# Patient Record
Sex: FEMALE | Race: ASIAN | Employment: FULL TIME | URBAN - METROPOLITAN AREA
[De-identification: names, ages, dates, MRNs, and addresses within clinical notes are randomized per-mention and may not be internally consistent; named-entity substitution may affect disease eponyms.]

---

## 2018-07-02 ENCOUNTER — OFFICE VISIT (OUTPATIENT)
Dept: SURGERY | Age: 43
End: 2018-07-02

## 2018-07-02 VITALS
HEIGHT: 68 IN | SYSTOLIC BLOOD PRESSURE: 108 MMHG | WEIGHT: 198 LBS | TEMPERATURE: 98.6 F | HEART RATE: 93 BPM | OXYGEN SATURATION: 97 % | DIASTOLIC BLOOD PRESSURE: 82 MMHG | BODY MASS INDEX: 30.01 KG/M2 | RESPIRATION RATE: 17 BRPM

## 2018-07-02 DIAGNOSIS — K64.2 GRADE III HEMORRHOIDS: Primary | ICD-10-CM

## 2018-07-02 NOTE — PROGRESS NOTES
HPI: Wilmar Reeder is a 41 y.o. female presenting with chief complain of hemorrhoids. They have gotten larger more recently. There is pain and throbbing discomfort. She has tried sitz baths and Preparation H. She has occasional lower abdominal pain when she has constipation. She has 2 bowel movements per day. She is not on a regimen. The constipation is better when she takes fluids. She denies blood per rectum or fecal incontinence. She is a  with no history of tear. Past Medical History:   Diagnosis Date    Anal fissure     Constipation     pain. BM every 2-3 weeks. patient cannot evacuate unless she uses a rocking motion.  Hemorrhoids     external    Infrequent bowel movements        Past Surgical History:   Procedure Laterality Date    HX BUNIONECTOMY      HX COLONOSCOPY  10/09/2009    HX HYSTERECTOMY      HX TONSILLECTOMY         Family History   Problem Relation Age of Onset    Cancer Paternal Aunt      stomach    Cancer Other      breast.  father's side of family       Social History     Social History    Marital status: SINGLE     Spouse name: N/A    Number of children: N/A    Years of education: N/A     Social History Main Topics    Smoking status: Never Smoker    Smokeless tobacco: Never Used    Alcohol use No    Drug use: No    Sexual activity: Yes     Other Topics Concern    None     Social History Narrative       Review of Systems - Review of Systems   Constitutional: Positive for weight loss. Negative for chills, diaphoresis, fever and malaise/fatigue. Trying to lose weight   HENT: Negative. Eyes: Negative. Respiratory: Positive for cough. Negative for hemoptysis, sputum production, shortness of breath and wheezing. Cardiovascular: Negative. Gastrointestinal: Positive for abdominal pain. Negative for blood in stool, constipation, diarrhea, heartburn, melena, nausea and vomiting. Genitourinary: Negative. Musculoskeletal: Negative.     Skin: Negative. Neurological: Negative. Negative for weakness. Endo/Heme/Allergies: Negative. Psychiatric/Behavioral: Negative. No Known Allergies    Vitals:    07/02/18 1122   BP: 108/82   Pulse: 93   Resp: 17   Temp: 98.6 °F (37 °C)   TempSrc: Oral   SpO2: 97%   Weight: 89.8 kg (198 lb)   Height: 5' 8\" (1.727 m)   PainSc:   3   PainLoc: Rectum       Physical Exam   Constitutional: She appears well-developed and well-nourished. HENT:   Head: Normocephalic and atraumatic. Eyes: Conjunctivae and EOM are normal.   Abdominal: Soft. She exhibits no distension. There is no tenderness. Musculoskeletal: Normal range of motion. Lymphadenopathy:     She has no cervical adenopathy. Right: No inguinal adenopathy present. Left: No inguinal adenopathy present. Neurological: She exhibits normal muscle tone. Skin: Skin is warm and dry. No rash noted. Psychiatric: She has a normal mood and affect. Her speech is normal.   Rectum: Enlarged and swollen right posterior quadrant hemorrhoid with visible mucosa,  right anterior and left lateral are less enlarged  Anoscopy: Enlarged left lateral internal hemorrhoid    Assessment / Plan    Grade 3 hemorrhoids  Trial of Citrucel while she is in the process of getting   She will return for follow-up after the honeymoon if this still symptomatic to consider hemorrhoidectomy    The diagnoses and plan were discussed with the patient. All questions answered. Plan of care agreed to by all concerned.

## 2018-07-02 NOTE — LETTER
7/2/2018 12:00 PM 
 
Patient:  Wilmar Reeder YOB: 1975 Date of Visit: 7/2/2018 HUYEN Alfaro [address] [address] VIA Facsimile: [phone] Dear Alex December, I saw Sai Mcgowan in the office today regarding her hemorrhoids. They have been bothering her for several years now, but more recently has gotten very swollen. On exam she has a very large right posterior quadrant hemorrhoid. On anoscopy she has enlarged internal hemorrhoids on the left side. She is to be  later this summer, and I recommend a high-fiber diet and fiber supplementation to see if this helps. If she is still symptomatic after the wedding she will return to the office and we will discuss hemorrhoidectomy. Thank you very much for your referral of Ms. Wilmar Reeder. If you have questions, please do not hesitate to call me. I look forward to following Ms. Edgardo Ashton along with you and will keep you updated as to her progress. Sincerely, Margo Irby MD

## 2018-09-17 ENCOUNTER — OFFICE VISIT (OUTPATIENT)
Dept: SURGERY | Age: 43
End: 2018-09-17

## 2018-09-17 VITALS
SYSTOLIC BLOOD PRESSURE: 120 MMHG | DIASTOLIC BLOOD PRESSURE: 84 MMHG | HEART RATE: 72 BPM | RESPIRATION RATE: 18 BRPM | WEIGHT: 195 LBS | TEMPERATURE: 98.7 F | BODY MASS INDEX: 29.55 KG/M2 | HEIGHT: 68 IN

## 2018-09-17 DIAGNOSIS — K64.2 GRADE III HEMORRHOIDS: Primary | ICD-10-CM

## 2018-09-17 NOTE — PROGRESS NOTES
Subjective: Continued discomfort from enlarged hemorrhoids. Past medical history and ROS were reviewed and unchanged. Rectum: Prolapsed right-sided hemorrhoid, left-sided hemorrhoid smaller  Digital rectal exam: Excellent tone, no mass    Assessment / Plan    Grade 3 hemorrhoids, continued symptoms  Schedule hemorrhoidectomy    A total of 15 minutes was spent with the patient, with >50% of time spent on counseling and coordination of care. The diagnoses and plan were discussed with patient. All questions answered. Plan of care agreed to by all concerned.

## 2018-09-17 NOTE — MR AVS SNAPSHOT
[address] [place] [address] 
[phone] Patient: Lan Emanuel MRN: [id number] :1975 Visit Information Date & Time Provider Department Dept. Phone Encounter #  
 2018  1:00 PM Tamara Gonzalez MD [facility] [phone] Follow-up Instructions Return for Surgery. Follow-up and Disposition History Upcoming Health Maintenance Date Due DTaP/Tdap/Td series (1 - Tdap) 1998 PAP AKA CERVICAL CYTOLOGY 1998 Influenza Age 5 to Adult 2018 COLONOSCOPY 10/9/2019 Allergies as of 2018  Review Complete On: 2018 By: Tamara Gonzalez MD  
 No Known Allergies Current Immunizations  Never Reviewed No immunizations on file. Not reviewed this visit You Were Diagnosed With   
  
 Codes Comments Grade III hemorrhoids    -  Primary ICD-10-CM: X77.6 ICD-9-CM: 455.0 Vitals BP Pulse Temp Resp Height(growth percentile) Weight(growth percentile) 120/84 72 98.7 °F (37.1 °C) 18 5' 8\" (1.727 m) 195 lb (88.5 kg) BMI OB Status Smoking Status 29.65 kg/m2 Hysterectomy Never Smoker BMI and BSA Data Body Mass Index Body Surface Area  
 29.65 kg/m 2 2.06 m 2 Your Updated Medication List  
  
Notice  As of 2018  1:18 PM  
 You have not been prescribed any medications. We Performed the Following SCHEDULE SURGERY [NLU7365 Custom] Follow-up Instructions Return for Surgery. Introducing [facility]! [employer] introduces [employer] patient portal. Now you can access parts of your medical record, email your doctor's office, and request medication refills online. 1. In your internet browser, go to [email or web address] 2. Click on the First Time User? Click Here link in the Sign In box. You will see the New Member Sign Up page. 3. Enter your [employer] Access Code exactly as it appears below. You will not need to use this code after youve completed the sign-up process. If you do not sign up before the expiration date, you must request a new code. · [employer] Access Code: [email or web address] Expires: 9/30/2018 11:07 AM 
 
4. Enter the last four digits of your Social Security Number (xxxx) and Date of Birth (mm/dd/yyyy) as indicated and click Submit. You will be taken to the next sign-up page. 5. Create a [employer] ID. This will be your [employer] login ID and cannot be changed, so think of one that is secure and easy to remember. 6. Create a [employer] password. You can change your password at any time. 7. Enter your Password Reset Question and Answer. This can be used at a later time if you forget your password. 8. Enter your e-mail address. You will receive e-mail notification when new information is available in [address]. 9. Click Sign Up. You can now view and download portions of your medical record. 10. Click the Download Summary menu link to download a portable copy of your medical information. If you have questions, please visit the Frequently Asked Questions section of the [employer] website. Remember, [employer] is NOT to be used for urgent needs. For medical emergencies, dial 911. Now available from your iPhone and Android! Please provide this summary of care documentation to your next provider. Your primary care clinician is listed as Cortney Hamm. If you have any questions after today's visit, please call [phone].

## 2018-09-27 ENCOUNTER — ANESTHESIA EVENT (OUTPATIENT)
Dept: SURGERY | Age: 43
End: 2018-09-27
Payer: COMMERCIAL

## 2018-09-28 ENCOUNTER — HOSPITAL ENCOUNTER (OUTPATIENT)
Age: 43
Setting detail: OUTPATIENT SURGERY
Discharge: HOME | End: 2018-09-28
Attending: COLON & RECTAL SURGERY | Admitting: COLON & RECTAL SURGERY
Payer: COMMERCIAL

## 2018-09-28 ENCOUNTER — ANESTHESIA (OUTPATIENT)
Dept: SURGERY | Age: 43
End: 2018-09-28
Payer: COMMERCIAL

## 2018-09-28 VITALS
HEART RATE: 77 BPM | HEIGHT: 68 IN | WEIGHT: 196.6 LBS | BODY MASS INDEX: 29.8 KG/M2 | DIASTOLIC BLOOD PRESSURE: 70 MMHG | TEMPERATURE: 98.1 F | RESPIRATION RATE: 18 BRPM | OXYGEN SATURATION: 100 % | SYSTOLIC BLOOD PRESSURE: 110 MMHG

## 2018-09-28 DIAGNOSIS — K64.2 GRADE III HEMORRHOIDS: Primary | ICD-10-CM

## 2018-09-28 PROCEDURE — 74011250636 HC RX REV CODE- 250/636

## 2018-09-28 PROCEDURE — 76060000033 HC ANESTHESIA 1 TO 1.5 HR: Performed by: COLON & RECTAL SURGERY

## 2018-09-28 PROCEDURE — 74011250636 HC RX REV CODE- 250/636: Performed by: NURSE ANESTHETIST, CERTIFIED REGISTERED

## 2018-09-28 PROCEDURE — 76210000021 HC REC RM PH II 0.5 TO 1 HR: Performed by: COLON & RECTAL SURGERY

## 2018-09-28 PROCEDURE — 76010000149 HC OR TIME 1 TO 1.5 HR: Performed by: COLON & RECTAL SURGERY

## 2018-09-28 PROCEDURE — 77030032490 HC SLV COMPR SCD KNE COVD -B: Performed by: COLON & RECTAL SURGERY

## 2018-09-28 PROCEDURE — 74011000250 HC RX REV CODE- 250: Performed by: COLON & RECTAL SURGERY

## 2018-09-28 PROCEDURE — 77030018836 HC SOL IRR NACL ICUM -A: Performed by: COLON & RECTAL SURGERY

## 2018-09-28 PROCEDURE — 76210000006 HC OR PH I REC 0.5 TO 1 HR: Performed by: COLON & RECTAL SURGERY

## 2018-09-28 PROCEDURE — 74011250637 HC RX REV CODE- 250/637: Performed by: NURSE ANESTHETIST, CERTIFIED REGISTERED

## 2018-09-28 PROCEDURE — 77030011266 HC ELECTRD BLD INSL COVD -A: Performed by: COLON & RECTAL SURGERY

## 2018-09-28 PROCEDURE — 77030031139 HC SUT VCRL2 J&J -A: Performed by: COLON & RECTAL SURGERY

## 2018-09-28 PROCEDURE — 88304 TISSUE EXAM BY PATHOLOGIST: CPT | Performed by: COLON & RECTAL SURGERY

## 2018-09-28 PROCEDURE — 74011250636 HC RX REV CODE- 250/636: Performed by: COLON & RECTAL SURGERY

## 2018-09-28 RX ORDER — DEXAMETHASONE SODIUM PHOSPHATE 4 MG/ML
INJECTION, SOLUTION INTRA-ARTICULAR; INTRALESIONAL; INTRAMUSCULAR; INTRAVENOUS; SOFT TISSUE AS NEEDED
Status: DISCONTINUED | OUTPATIENT
Start: 2018-09-28 | End: 2018-09-28 | Stop reason: HOSPADM

## 2018-09-28 RX ORDER — HYDROMORPHONE HYDROCHLORIDE 2 MG/ML
0.5 INJECTION, SOLUTION INTRAMUSCULAR; INTRAVENOUS; SUBCUTANEOUS AS NEEDED
Status: DISCONTINUED | OUTPATIENT
Start: 2018-09-28 | End: 2018-09-28 | Stop reason: HOSPADM

## 2018-09-28 RX ORDER — PROPOFOL 10 MG/ML
INJECTION, EMULSION INTRAVENOUS AS NEEDED
Status: DISCONTINUED | OUTPATIENT
Start: 2018-09-28 | End: 2018-09-28 | Stop reason: HOSPADM

## 2018-09-28 RX ORDER — OXYCODONE AND ACETAMINOPHEN 5; 325 MG/1; MG/1
1-2 TABLET ORAL
Qty: 40 TAB | Refills: 0 | Status: SHIPPED | OUTPATIENT
Start: 2018-09-28

## 2018-09-28 RX ORDER — INSULIN LISPRO 100 [IU]/ML
INJECTION, SOLUTION INTRAVENOUS; SUBCUTANEOUS ONCE
Status: DISCONTINUED | OUTPATIENT
Start: 2018-09-28 | End: 2018-09-28 | Stop reason: HOSPADM

## 2018-09-28 RX ORDER — SODIUM CHLORIDE 0.9 % (FLUSH) 0.9 %
5-10 SYRINGE (ML) INJECTION EVERY 8 HOURS
Status: DISCONTINUED | OUTPATIENT
Start: 2018-09-28 | End: 2018-09-28 | Stop reason: HOSPADM

## 2018-09-28 RX ORDER — FENTANYL CITRATE 50 UG/ML
INJECTION, SOLUTION INTRAMUSCULAR; INTRAVENOUS AS NEEDED
Status: DISCONTINUED | OUTPATIENT
Start: 2018-09-28 | End: 2018-09-28 | Stop reason: HOSPADM

## 2018-09-28 RX ORDER — SODIUM CHLORIDE, SODIUM LACTATE, POTASSIUM CHLORIDE, CALCIUM CHLORIDE 600; 310; 30; 20 MG/100ML; MG/100ML; MG/100ML; MG/100ML
75 INJECTION, SOLUTION INTRAVENOUS CONTINUOUS
Status: DISCONTINUED | OUTPATIENT
Start: 2018-09-28 | End: 2018-09-28 | Stop reason: HOSPADM

## 2018-09-28 RX ORDER — FAMOTIDINE 20 MG/1
20 TABLET, FILM COATED ORAL ONCE
Status: COMPLETED | OUTPATIENT
Start: 2018-09-28 | End: 2018-09-28

## 2018-09-28 RX ORDER — SODIUM CHLORIDE 0.9 % (FLUSH) 0.9 %
5-10 SYRINGE (ML) INJECTION AS NEEDED
Status: DISCONTINUED | OUTPATIENT
Start: 2018-09-28 | End: 2018-09-28 | Stop reason: HOSPADM

## 2018-09-28 RX ORDER — HYDROMORPHONE HYDROCHLORIDE 2 MG/ML
INJECTION, SOLUTION INTRAMUSCULAR; INTRAVENOUS; SUBCUTANEOUS
Status: COMPLETED
Start: 2018-09-28 | End: 2018-09-28

## 2018-09-28 RX ORDER — MIDAZOLAM HYDROCHLORIDE 1 MG/ML
INJECTION, SOLUTION INTRAMUSCULAR; INTRAVENOUS AS NEEDED
Status: DISCONTINUED | OUTPATIENT
Start: 2018-09-28 | End: 2018-09-28 | Stop reason: HOSPADM

## 2018-09-28 RX ORDER — LIDOCAINE HYDROCHLORIDE 20 MG/ML
INJECTION, SOLUTION EPIDURAL; INFILTRATION; INTRACAUDAL; PERINEURAL AS NEEDED
Status: DISCONTINUED | OUTPATIENT
Start: 2018-09-28 | End: 2018-09-28 | Stop reason: HOSPADM

## 2018-09-28 RX ORDER — PROPOFOL 10 MG/ML
INJECTION, EMULSION INTRAVENOUS
Status: DISCONTINUED | OUTPATIENT
Start: 2018-09-28 | End: 2018-09-28 | Stop reason: HOSPADM

## 2018-09-28 RX ORDER — NALOXONE HYDROCHLORIDE 0.4 MG/ML
0.1 INJECTION, SOLUTION INTRAMUSCULAR; INTRAVENOUS; SUBCUTANEOUS ONCE
Status: DISCONTINUED | OUTPATIENT
Start: 2018-09-28 | End: 2018-09-28 | Stop reason: HOSPADM

## 2018-09-28 RX ORDER — ONDANSETRON 2 MG/ML
INJECTION INTRAMUSCULAR; INTRAVENOUS AS NEEDED
Status: DISCONTINUED | OUTPATIENT
Start: 2018-09-28 | End: 2018-09-28 | Stop reason: HOSPADM

## 2018-09-28 RX ORDER — HYDROMORPHONE HYDROCHLORIDE 2 MG/ML
0.5 INJECTION, SOLUTION INTRAMUSCULAR; INTRAVENOUS; SUBCUTANEOUS AS NEEDED
Status: DISCONTINUED | OUTPATIENT
Start: 2018-09-28 | End: 2018-09-28

## 2018-09-28 RX ORDER — LIDOCAINE HYDROCHLORIDE 10 MG/ML
0.1 INJECTION, SOLUTION EPIDURAL; INFILTRATION; INTRACAUDAL; PERINEURAL AS NEEDED
Status: DISCONTINUED | OUTPATIENT
Start: 2018-09-28 | End: 2018-09-28 | Stop reason: HOSPADM

## 2018-09-28 RX ADMIN — HYDROMORPHONE HYDROCHLORIDE 0.5 MG: 2 INJECTION, SOLUTION INTRAMUSCULAR; INTRAVENOUS; SUBCUTANEOUS at 12:15

## 2018-09-28 RX ADMIN — PROPOFOL 50 MG: 10 INJECTION, EMULSION INTRAVENOUS at 11:10

## 2018-09-28 RX ADMIN — FENTANYL CITRATE 100 MCG: 50 INJECTION, SOLUTION INTRAMUSCULAR; INTRAVENOUS at 11:10

## 2018-09-28 RX ADMIN — HYDROMORPHONE HYDROCHLORIDE 0.5 MG: 2 INJECTION INTRAMUSCULAR; INTRAVENOUS; SUBCUTANEOUS at 12:34

## 2018-09-28 RX ADMIN — SODIUM CHLORIDE, SODIUM LACTATE, POTASSIUM CHLORIDE, AND CALCIUM CHLORIDE 75 ML/HR: 600; 310; 30; 20 INJECTION, SOLUTION INTRAVENOUS at 09:37

## 2018-09-28 RX ADMIN — LIDOCAINE HYDROCHLORIDE 40 MG: 20 INJECTION, SOLUTION EPIDURAL; INFILTRATION; INTRACAUDAL; PERINEURAL at 11:10

## 2018-09-28 RX ADMIN — MIDAZOLAM HYDROCHLORIDE 2 MG: 1 INJECTION, SOLUTION INTRAMUSCULAR; INTRAVENOUS at 11:29

## 2018-09-28 RX ADMIN — PROPOFOL 75 MCG/KG/MIN: 10 INJECTION, EMULSION INTRAVENOUS at 11:10

## 2018-09-28 RX ADMIN — HYDROMORPHONE HYDROCHLORIDE 0.5 MG: 2 INJECTION, SOLUTION INTRAMUSCULAR; INTRAVENOUS; SUBCUTANEOUS at 12:34

## 2018-09-28 RX ADMIN — ONDANSETRON 4 MG: 2 INJECTION INTRAMUSCULAR; INTRAVENOUS at 11:44

## 2018-09-28 RX ADMIN — DEXAMETHASONE SODIUM PHOSPHATE 4 MG: 4 INJECTION, SOLUTION INTRA-ARTICULAR; INTRALESIONAL; INTRAMUSCULAR; INTRAVENOUS; SOFT TISSUE at 11:20

## 2018-09-28 RX ADMIN — MIDAZOLAM HYDROCHLORIDE 2 MG: 1 INJECTION, SOLUTION INTRAMUSCULAR; INTRAVENOUS at 10:52

## 2018-09-28 RX ADMIN — FAMOTIDINE 20 MG: 20 TABLET ORAL at 09:13

## 2018-09-28 NOTE — H&P
HPI: Tameka Jacobs is a 41 y.o. female presenting with chief complain of hemorrhoids Past Medical History:  
Diagnosis Date  Anal fissure  Constipation   
 pain. BM every 2-3 weeks. patient cannot evacuate unless she uses a rocking motion.  Hemorrhoids   
 external  
 Infrequent bowel movements  Thromboembolus ([address] PE Past Surgical History:  
Procedure Laterality Date  HX BUNIONECTOMY  HX COLONOSCOPY  10/09/2009  HX HYSTERECTOMY  HX TONSILLECTOMY Family History Problem Relation Age of Onset  Cancer Paternal Aunt   
  stomach  Cancer Other   
  breast.  father's side of family Social History Social History  Marital status:  Spouse name: N/A  
 Number of children: N/A  
 Years of education: N/A Social History Main Topics  Smoking status: Never Smoker  Smokeless tobacco: Never Used  Alcohol use No  
 Drug use: No  
 Sexual activity: Yes Other Topics Concern  None Social History Narrative Review of Systems - neg No Known Allergies Vitals:  
 09/26/18 2571 09/28/18 8559 09/28/18 6574 BP:   119/79 Pulse:   82 Resp:   20 Temp:   98.5 °F (36.9 °C) SpO2:   100% Weight: 89.8 kg (198 lb) 89.2 kg (196 lb 9.6 oz) Height: 5' 8\" (1.727 m) 5' 8\" (1.727 m) Physical Exam  
Constitutional: She appears well-developed and well-nourished. HENT:  
Head: Normocephalic and atraumatic. Eyes: Conjunctivae and EOM are normal.  
Abdominal: Soft. She exhibits no distension. There is no tenderness. Musculoskeletal: Normal range of motion. Lymphadenopathy:  
  She has no cervical adenopathy. Right: No inguinal adenopathy present. Left: No inguinal adenopathy present. Neurological: She exhibits normal muscle tone. Skin: Skin is warm and dry. No rash noted. Psychiatric: She has a normal mood and affect. Her speech is normal.  
 
 
Assessment / Plan 
 
hemorrhoidectomy The diagnoses and plan were discussed with the patient. All questions answered. Plan of care agreed to by all concerned.

## 2018-09-28 NOTE — ANESTHESIA PREPROCEDURE EVALUATION
Anesthetic History No history of anesthetic complications Review of Systems / Medical History Patient summary reviewed and pertinent labs reviewed Pulmonary Within defined limits Neuro/Psych Within defined limits Cardiovascular Within defined limits Exercise tolerance: >4 METS Comments: PE  
GI/Hepatic/Renal 
Within defined limits Endo/Other Within defined limits Other Findings Physical Exam 
 
Airway Mallampati: II 
TM Distance: 4 - 6 cm Neck ROM: normal range of motion Mouth opening: Normal 
 
 Cardiovascular Regular rate and rhythm,  S1 and S2 normal,  no murmur, click, rub, or gallop Rhythm: regular Rate: normal 
 
 
 
 Dental 
 
Dentition: Lower dentition intact and Upper dentition intact Pulmonary Breath sounds clear to auscultation Abdominal 
GI exam deferred Other Findings Anesthetic Plan ASA: 2 Anesthesia type: MAC Induction: Intravenous Anesthetic plan and risks discussed with: Patient

## 2018-09-28 NOTE — IP AVS SNAPSHOT
[facility] 
 
 
 [address] Patient: Angela Loya MRN: [id number] :1975 About your hospitalization You were admitted on:  2018 You last received care in the:  [facility] PHASE 2 RECOVERY You were discharged on:  2018 Why you were hospitalized Your primary diagnosis was:  Not on File Follow-up Information Follow up With Details Comments Contact Info Mario Krueger PA-C   [place] [address] [address] [address] 
[phone] Kylah Klein MD  Follow up in 3 weeks [address] 
[phone] Discharge Orders None A check dora indicates which time of day the medication should be taken. My Medications START taking these medications Instructions Each Dose to Equal  
 Morning Noon Evening Bedtime  
 oxyCODONE-acetaminophen 5-325 mg per tablet Commonly known as:  PERCOCET Your last dose was: Your next dose is: Take 1-2 Tabs by mouth every four (4) hours as needed for Pain. Max Daily Amount: 12 Tabs. 1-2 Tab Where to Get Your Medications Information on where to get these meds will be given to you by the nurse or doctor. ! Ask your nurse or doctor about these medications  
  oxyCODONE-acetaminophen 5-325 mg per tablet Opioid Education Prescription Opioids: What You Need to Know: 
 
Prescription opioids can be used to help relieve moderate-to-severe pain and are often prescribed following a surgery or injury, or for certain health conditions. These medications can be an important part of treatment but also come with serious risks. Opioids are strong pain medicines. Examples include hydrocodone, oxycodone, fentanyl, and morphine. Heroin is an example of an illegal opioid.   It is important to work with your health care provider to make sure you are getting the safest, most effective care. WHAT ARE THE RISKS AND SIDE EFFECTS OF OPIOID USE? Prescription opioids carry serious risks of addiction and overdose, especially with prolonged use. An opioid overdose, often marked by slow breathing, can cause sudden death. The use of prescription opioids can have a number of side effects as well, even when taken as directed. · Tolerance-meaning you might need to take more of a medication for the same pain relief · Physical dependence-meaning you have symptoms of withdrawal when the medication is stopped. Withdrawal symptoms can include nausea, sweating, chills, diarrhea, stomach cramps, and muscle aches. Withdrawal can last up to several weeks, depending on which drug you took and how long you took it. · Increased sensitivity to pain · Constipation · Nausea, vomiting, and dry mouth · Sleepiness and dizziness · Confusion · Depression · Low levels of testosterone that can result in lower sex drive, energy, and strength · Itching and sweating RISKS ARE GREATER WITH:      
· History of drug misuse, substance use disorder, or overdose · Mental health conditions (such as depression or anxiety) · Sleep apnea · Older age (72 years or older) · Pregnancy Avoid alcohol while taking prescription opioids. Also, unless specifically advised by your health care provider, medications to avoid include: · Benzodiazepines (such as Xanax or Valium) · Muscle relaxants (such as Soma or Flexeril) · Hypnotics (such as Ambien or Lunesta) · Other prescription opioids KNOW YOUR OPTIONS Talk to your health care provider about ways to manage your pain that don't involve prescription opioids. Some of these options may actually work better and have fewer risks and side effects. Consult your physician before adding or stopping any medications, treatments, or physical activity. Options may include: · Pain relievers such as acetaminophen, ibuprofen, and naproxen · Some medications that are also used for depression or seizures · Physical therapy and exercise · Counseling to help patients learn how to cope better with triggers of pain and stress. · Application of heat or cold compress · Massage therapy · Relaxation techniques Be Informed Make sure you know the name of your medication, how much and how often to take it, and its potential risks & side effects. IF YOU ARE PRESCRIBED OPIOIDS FOR PAIN: 
· Never take opioids in greater amounts or more often than prescribed. Remember the goal is not to be pain-free but to manage your pain at a tolerable level. · Follow up with your primary care provider to: · Work together to create a plan on how to manage your pain. · Talk about ways to help manage your pain that don't involve prescription opioids. · Talk about any and all concerns and side effects. · Help prevent misuse and abuse. · Never sell or share prescription opioids · Help prevent misuse and abuse. · Store prescription opioids in a secure place and out of reach of others (this may include visitors, children, friends, and family). · Safely dispose of unused/unwanted prescription opioids: Find your community drug take-back program or your pharmacy mail-back program, or flush them down the toilet, following guidance from the Food and Drug Administration (www.fda.gov/Drugs/ResourcesForYou). · Visit www.cdc.gov/drugoverdose to learn about the risks of opioid abuse and overdose. · If you believe you may be struggling with addiction, tell your health care provider and ask for guidance or call [employer] at 0-466-920-FGTS. Discharge Instructions Instructions After Hemorrhoidectomy A packing was placed. It will fall out on its own and that is OK. Apply dry dressings on your bottom as necessary. Please take 4 dulcolax tablets tonight Please take 2 tablespoons of mineral oil daily for 5 days starting tomorrow Then STOP mineral oil and start metamucil daily Sitz baths for comfort Percocet for pain Avoid Aspirin or ibuprofen (Advil, Aleve, Motrin), Tylenol is OK Please call the office if you have not had a bowel movement in 3 days. DISCHARGE SUMMARY from Nurse PATIENT INSTRUCTIONS: 
 
After general anesthesia or intravenous sedation, for 24 hours or while taking prescription Narcotics: · Limit your activities · Do not drive and operate hazardous machinery · Do not make important personal or business decisions · Do  not drink alcoholic beverages · If you have not urinated within 8 hours after discharge, please contact your surgeon on call. Report the following to your surgeon: 
· Excessive pain, swelling, redness or odor of or around the surgical area · Temperature over 100.5 · Nausea and vomiting lasting longer than 4 hours or if unable to take medications · Any signs of decreased circulation or nerve impairment to extremity: change in color, persistent  numbness, tingling, coldness or increase pain · Any questions What to do at Home: 
Recommended activity: Activity as tolerated and no driving for today These are general instructions for a healthy lifestyle: No smoking/ No tobacco products/ Avoid exposure to second hand smoke Surgeon General's Warning:  Quitting smoking now greatly reduces serious risk to your health. Obesity, smoking, and sedentary lifestyle greatly increases your risk for illness A healthy diet, regular physical exercise & weight monitoring are important for maintaining a healthy lifestyle You may be retaining fluid if you have a history of heart failure or if you experience any of the following symptoms:  Weight gain of 3 pounds or more overnight or 5 pounds in a week, increased swelling in our hands or feet or shortness of breath while lying flat in bed. Please call your doctor as soon as you notice any of these symptoms; do not wait until your next office visit. Recognize signs and symptoms of STROKE: 
 
F-face looks uneven A-arms unable to move or move unevenly S-speech slurred or non-existent T-time-call 911 as soon as signs and symptoms begin-DO NOT go Back to bed or wait to see if you get better-TIME IS BRAIN. Warning Signs of HEART ATTACK Call 911 if you have these symptoms: 
? Chest discomfort. Most heart attacks involve discomfort in the center of the chest that lasts more than a few minutes, or that goes away and comes back. It can feel like uncomfortable pressure, squeezing, fullness, or pain. ? Discomfort in other areas of the upper body. Symptoms can include pain or discomfort in one or both arms, the back, neck, jaw, or stomach. ? Shortness of breath with or without chest discomfort. ? Other signs may include breaking out in a cold sweat, nausea, or lightheadedness. Don't wait more than five minutes to call [address]! Fast action can save your life. Calling 911 is almost always the fastest way to get lifesaving treatment. Emergency Medical Services staff can begin treatment when they arrive  up to an hour sooner than if someone gets to the hospital by car. The discharge information has been reviewed with the patient. The patient verbalized understanding. Discharge medications reviewed with the patient and appropriate educational materials and side effects teaching were provided. ___________________________________________________________________________________________________________________________________ Introducing [facility]! [employer] introduces [employer] patient portal. Now you can access parts of your medical record, email your doctor's office, and request medication refills online.    
 
1. In your internet browser, go to [email or web address] 2. Click on the First Time User? Click Here link in the Sign In box. You will see the New Member Sign Up page. 3. Enter your HALFPOPS Access Code exactly as it appears below. You will not need to use this code after youve completed the sign-up process. If you do not sign up before the expiration date, you must request a new code. · HALFPOPS Access Code: [email or web address] Expires: 9/30/2018 11:07 AM 
 
4. Enter the last four digits of your Social Security Number (xxxx) and Date of Birth (mm/dd/yyyy) as indicated and click Submit. You will be taken to the next sign-up page. 5. Create a Scannxt ID. This will be your HALFPOPS login ID and cannot be changed, so think of one that is secure and easy to remember. 6. Create a HALFPOPS password. You can change your password at any time. 7. Enter your Password Reset Question and Answer. This can be used at a later time if you forget your password. 8. Enter your e-mail address. You will receive e-mail notification when new information is available in [address] 9. Click Sign Up. You can now view and download portions of your medical record. 10. Click the Download Summary menu link to download a portable copy of your medical information. If you have questions, please visit the Frequently Asked Questions section of the HALFPOPS website. Remember, HALFPOPS is NOT to be used for urgent needs. For medical emergencies, dial 911. Now available from your iPhone and Android! Introducing Daren More As a [employer] patient, I wanted to make you aware of our electronic visit tool called Daren More. [employer] 24/7 allows you to connect within minutes with a medical provider 24 hours a day, seven days a week via a mobile device or tablet or logging into a secure website from your computer. You can access Daren More from anywhere in the [place]. A virtual visit might be right for you when you have a simple condition and feel like you just dont want to get out of bed, or cant get away from work for an appointment, when your regular [facility] provider is not available (evenings, weekends or holidays), or when youre out of town and need minor care. Electronic visits cost only $49 and if the [email or web address] provider determines a prescription is needed to treat your condition, one can be electronically transmitted to a nearby pharmacy*. Please take a moment to enroll today if you have not already done so. The enrollment process is free and takes just a few minutes. To enroll, please download the LensAR joseph to your tablet or phone, or visit [email or web address] to enroll on your computer. And, as an [address] [place] [address] patient with a iHealthHome account, the results of your visits will be scanned into your electronic medical record and your primary care provider will be able to view the scanned results. We urge you to continue to see your regular [facility] provider for your ongoing medical care. And while your primary care provider may not be the one available when you seek a Daren Dorianvandana virtual visit, the peace of mind you get from getting a real diagnosis real time can be priceless. For more information on Daren More, view our Frequently Asked Questions (FAQs) at [email or web address]. Sincerely, 
 
Dima Casper MD 
Chief Medical Officer [facility] Madelin Murray *:  certain medications cannot be prescribed via Daren [employer]andana Providers Seen During Your Hospitalization Provider Specialty Primary office phone Anupama Cortes MD Colon and Rectal Surgery [phone] Your Primary Care Physician (PCP) Primary Care Physician Office Phone Office Fax Stefanie Mac [phone] You are allergic to the following No active allergies Recent Documentation Height Weight BMI OB Status Smoking Status 1.727 m 89.2 kg 29.89 kg/m2 Hysterectomy Never Smoker Emergency Contacts Name Discharge Info Relation Home Work Mobile Ermias Rushing DISCHARGE CAREGIVER [3] Spouse [3] [phone] Geoffrey Cardenas DISCHARGE CAREGIVER [3] Child [2] [phone] Patient Belongings The following personal items are in your possession at time of discharge: 
  Dental Appliances: None  Visual Aid: None      Home Medications: None   Jewelry: None  Clothing: Undergarments, Pants, Shirt, Footwear    Other Valuables: Cell Phone, [facility] Please provide this summary of care documentation to your next provider. Signatures-by signing, you are acknowledging that this After Visit Summary has been reviewed with you and you have received a copy. Patient Signature:  ____________________________________________________________ Date:  ____________________________________________________________  
  
[facility] Provider Signature:  ____________________________________________________________ Date:  ____________________________________________________________

## 2018-09-28 NOTE — DISCHARGE INSTRUCTIONS
Instructions After Hemorrhoidectomy    A packing was placed. It will fall out on its own and that is OK. Apply dry dressings on your bottom as necessary. Please take 4 dulcolax tablets tonight  Please take 2 tablespoons of mineral oil daily for 5 days starting tomorrow  Then STOP mineral oil and start metamucil daily  Sitz baths for comfort  Percocet for pain  Avoid Aspirin or ibuprofen (Advil, Aleve, Motrin), Tylenol is OK  Please call the office if you have not had a bowel movement in 3 days. DISCHARGE SUMMARY from Nurse    PATIENT INSTRUCTIONS:    After general anesthesia or intravenous sedation, for 24 hours or while taking prescription Narcotics:  · Limit your activities  · Do not drive and operate hazardous machinery  · Do not make important personal or business decisions  · Do  not drink alcoholic beverages  · If you have not urinated within 8 hours after discharge, please contact your surgeon on call. Report the following to your surgeon:  · Excessive pain, swelling, redness or odor of or around the surgical area  · Temperature over 100.5  · Nausea and vomiting lasting longer than 4 hours or if unable to take medications  · Any signs of decreased circulation or nerve impairment to extremity: change in color, persistent  numbness, tingling, coldness or increase pain  · Any questions    What to do at Home:  Recommended activity: Activity as tolerated and no driving for today      These are general instructions for a healthy lifestyle:    No smoking/ No tobacco products/ Avoid exposure to second hand smoke  Surgeon General's Warning:  Quitting smoking now greatly reduces serious risk to your health.     Obesity, smoking, and sedentary lifestyle greatly increases your risk for illness    A healthy diet, regular physical exercise & weight monitoring are important for maintaining a healthy lifestyle    You may be retaining fluid if you have a history of heart failure or if you experience any of the following symptoms:  Weight gain of 3 pounds or more overnight or 5 pounds in a week, increased swelling in our hands or feet or shortness of breath while lying flat in bed. Please call your doctor as soon as you notice any of these symptoms; do not wait until your next office visit. Recognize signs and symptoms of STROKE:    F-face looks uneven    A-arms unable to move or move unevenly    S-speech slurred or non-existent    T-time-call 911 as soon as signs and symptoms begin-DO NOT go       Back to bed or wait to see if you get better-TIME IS BRAIN. Warning Signs of HEART ATTACK     Call 911 if you have these symptoms:   Chest discomfort. Most heart attacks involve discomfort in the center of the chest that lasts more than a few minutes, or that goes away and comes back. It can feel like uncomfortable pressure, squeezing, fullness, or pain.  Discomfort in other areas of the upper body. Symptoms can include pain or discomfort in one or both arms, the back, neck, jaw, or stomach.  Shortness of breath with or without chest discomfort.  Other signs may include breaking out in a cold sweat, nausea, or lightheadedness. Don't wait more than five minutes to call 911 - MINUTES MATTER! Fast action can save your life. Calling 911 is almost always the fastest way to get lifesaving treatment. Emergency Medical Services staff can begin treatment when they arrive -- up to an hour sooner than if someone gets to the hospital by car. The discharge information has been reviewed with the patient. The patient verbalized understanding. Discharge medications reviewed with the patient and appropriate educational materials and side effects teaching were provided.   ___________________________________________________________________________________________________________________________________

## 2018-09-28 NOTE — BRIEF OP NOTE
BRIEF OPERATIVE NOTE Date of Procedure: 9/28/2018 Preoperative Diagnosis: Grade III hemorrhoids [K64.2] Postoperative Diagnosis: Grade III hemorrhoids [K64.2] Procedure(s): HEMORRHOIDECTOMY/PRONE Surgeon(s) and Role: Adan Kaye MD - Primary Surgical Assistant: none Surgical Staff: 
Circ-1: Shell Davila RN 
Circ-Relief: Natalia Solis RN Scrub Tech-1: [facility] Scrub Tech-Relief: Viviana Ladd Surg Asst-1: Sven Imus Surg Asst-Relief: Kate Rota Event Time In Incision Start 1118 Incision Close Anesthesia: MAC Estimated Blood Loss: 20 ml Specimens:  
ID Type Source Tests Collected by Time Destination 1 : right posterior hemorrhoid Preservative Rectal  Noemi Friedman MD 9/28/2018 1122 Pathology 2 : left posterior hemorrhoid Preservative Rectal  Noemi Friedman MD 9/28/2018 1123 Pathology 3 : right anterior hemorrhoid Preservative Rectal  Noemi Friedman MD 9/28/2018 1128 Pathology Findings: hemorrhoids Complications: none Implants: * No implants in log * 
 
[id number]

## 2018-09-28 NOTE — ANESTHESIA POSTPROCEDURE EVALUATION
Post-Anesthesia Evaluation and Assessment Patient: La Barrientos MRN: [id number]  SSN: [id number] YOB: 1975  Age: 41 y.o. Sex: female Cardiovascular Function/Vital Signs Visit Vitals  /70  Pulse 77  Temp 36.7 °C (98.1 °F)  Resp 18  Ht 5' 8\" (1.727 m)  Wt 89.2 kg (196 lb 9.6 oz)  SpO2 100%  BMI 29.89 kg/m2 Patient is status post MAC anesthesia for Procedure(s): HEMORRHOIDECTOMY/PRONE. Nausea/Vomiting: None Postoperative hydration reviewed and adequate. Pain: 
Pain Scale 1: Numeric (0 - 10) (09/28/18 1305) Pain Intensity 1: 4 (09/28/18 1305) Managed Neurological Status:  
Neuro (WDL): Within Defined Limits (09/28/18 1205) At baseline Mental Status and Level of Consciousness: Arousable Pulmonary Status:  
O2 Device: Room air (09/28/18 1211) Adequate oxygenation and airway patent Complications related to anesthesia: None Post-anesthesia assessment completed. No concerns Signed By: Florencio Valdez MD   
 September 28, 2018

## 2018-09-28 NOTE — OP NOTES
[facility]  OPERATIVE REPORT    Lona Lemus  MR#: [id number]  : 1975  ACCOUNT #: [de-identified]   DATE OF SERVICE: 2018    PREOPERATIVE DIAGNOSIS:  Grade III hemorrhoids. POSTOPERATIVE DIAGNOSIS:  Grade III hemorrhoids. PROCEDURE PERFORMED:  Hemorrhoidectomy, 3 quadrant. SURGEON:  Kaitlin Foley MD     ANESTHESIA:  MAC.    ESTIMATED BLOOD LOSS:  10 mL. SPECIMENS REMOVED:  Hemorrhoids to pathology. COMPLICATIONS:  None. IMPLANTS:  None. ASSISTANTS:  None. INDICATIONS:  The patient is a 61-year-old woman with grade III hemorrhoids. She was brought to the operating room for surgical excision. I explained the risks of the procedure including bleeding, infection, incontinence and recurrence. She understood and wished to proceed. PROCEDURE:  The patient was properly identified in the holding area, brought to the operating room, where she was placed in the prone jackknife position. Sedation was administered by anesthesia. Buttocks were taped apart. Perianal area was prepped and draped in the usual sterile fashion. 40 mL of local anesthetic was injected. Digital rectal exam was performed and was normal.  Anoscopy was performed. The largest hemorrhoid was in the right posterior quadrant. There were smaller hemorrhoids in the left lateral and right anterior region. All were excised down to their pedicle with Metzenbaum scissors and suture ligated with 2-0 and 3-0 Vicryl suture. Hemostasis was assured with cautery with the right posterior and left lateral hemorrhoids. The right anterior hemorrhoid site was sutured closed with a 3-0 Vicryl suture primarily. Hemostasis was assured. Gelfoam packing was placed. Dry sterile dressings were applied. The patient tolerated the procedure well. All instrument, sponge and needle counts were correct at the end of the case x2.   The patient awoke from anesthesia, and was transported to the PACU in stable condition. MD MARIA TERESA Guerrero / ADRI  D: 09/28/2018 11:46     T: 09/28/2018 12:43  JOB #: [id number]

## 2018-10-18 ENCOUNTER — OFFICE VISIT (OUTPATIENT)
Dept: SURGERY | Age: 43
End: 2018-10-18

## 2018-10-18 VITALS
WEIGHT: 195 LBS | BODY MASS INDEX: 29.55 KG/M2 | OXYGEN SATURATION: 100 % | HEIGHT: 68 IN | SYSTOLIC BLOOD PRESSURE: 134 MMHG | RESPIRATION RATE: 18 BRPM | HEART RATE: 92 BPM | DIASTOLIC BLOOD PRESSURE: 82 MMHG | TEMPERATURE: 98 F

## 2018-10-18 DIAGNOSIS — K64.2 GRADE III HEMORRHOIDS: Primary | ICD-10-CM

## 2018-10-18 NOTE — PROGRESS NOTES
Subjective: Tolerating diet moving her bowels. Pain resolved. Occasional spots of blood. Past medical history and ROS were reviewed and unchanged. Rectum: Wounds healed  Digital rectal exam: Good tone and no mass    Assessment / Plan    Status post hemorrhoidectomy, healing well  Follow-up as needed  Recommend colonoscopy for routine screening purposes at age 41    The diagnoses and plan were discussed with patient. All questions answered. Plan of care agreed to by all concerned.

## (undated) DEVICE — PILLOW POS AD L7IN R FOAM HD REST INTUB SLOT DISP

## (undated) DEVICE — SUTURE VCRL SZ 2-0 L27IN ABSRB UD L26MM SH 1/2 CIR J417H

## (undated) DEVICE — REM POLYHESIVE ADULT PATIENT RETURN ELECTRODE: Brand: VALLEYLAB

## (undated) DEVICE — STERILE POLYISOPRENE POWDER-FREE SURGICAL GLOVES: Brand: PROTEXIS

## (undated) DEVICE — 3M™ MEDIPORE™ H SOFT CLOTH SURGICAL TAPE, 2863, 3 IN X 10 YD, 12/CASE: Brand: 3M™ MEDIPORE™

## (undated) DEVICE — ABDOMINAL PAD: Brand: DERMACEA

## (undated) DEVICE — NEEDLE HYPO 22GA L1.5IN BLK S STL HUB POLYPR SHLD REG BVL

## (undated) DEVICE — PACK PROCEDURE SURG MAJ W/ BASIN LF

## (undated) DEVICE — INTENDED FOR TISSUE SEPARATION, AND OTHER PROCEDURES THAT REQUIRE A SHARP SURGICAL BLADE TO PUNCTURE OR CUT.: Brand: BARD-PARKER ® STAINLESS STEEL BLADES

## (undated) DEVICE — NEEDLE ELECTRODE: Brand: EDGE

## (undated) DEVICE — GAUZE SPONGES,16 PLY: Brand: CURITY

## (undated) DEVICE — SUTURE VCRL SZ 3-0 L27IN ABSRB UD L26MM SH 1/2 CIR J416H

## (undated) DEVICE — 3M™ DURAPORE™ SURGICAL TAPE 1538-3, 3 INCH X 10 YARD (7,5CM X 9,1M), 4 ROLLS/BOX: Brand: 3M™ DURAPORE™

## (undated) DEVICE — SHEET, T, LAPAROTOMY, STERILE: Brand: MEDLINE

## (undated) DEVICE — SYR 10ML CTRL LR LCK NSAF LF --

## (undated) DEVICE — SOLUTION IV 1000ML 0.9% SOD CHL

## (undated) DEVICE — KENDALL SCD EXPRESS SLEEVES, KNEE LENGTH, MEDIUM: Brand: KENDALL SCD

## (undated) DEVICE — PREP SKN POV IOD 10% SOL 4OZ --

## (undated) DEVICE — KIT CLN UP BON SECOURS MARYV

## (undated) DEVICE — SMOKE EVACUATION PENCIL: Brand: VALLEYLAB

## (undated) DEVICE — FLEX ADVANTAGE 3000CC: Brand: FLEX ADVANTAGE